# Patient Record
Sex: MALE | Race: WHITE | Employment: OTHER | URBAN - METROPOLITAN AREA
[De-identification: names, ages, dates, MRNs, and addresses within clinical notes are randomized per-mention and may not be internally consistent; named-entity substitution may affect disease eponyms.]

---

## 2023-02-08 ENCOUNTER — OFFICE VISIT (OUTPATIENT)
Dept: NEUROLOGY | Age: 53
End: 2023-02-08
Payer: MEDICARE

## 2023-02-08 VITALS
SYSTOLIC BLOOD PRESSURE: 138 MMHG | WEIGHT: 205 LBS | HEART RATE: 61 BPM | BODY MASS INDEX: 28.7 KG/M2 | DIASTOLIC BLOOD PRESSURE: 89 MMHG | HEIGHT: 71 IN

## 2023-02-08 DIAGNOSIS — M20.42 HAMMER TOES OF BOTH FEET: ICD-10-CM

## 2023-02-08 DIAGNOSIS — G82.20 PARAPARESIS (HCC): ICD-10-CM

## 2023-02-08 DIAGNOSIS — N31.9 NEUROGENIC BLADDER: ICD-10-CM

## 2023-02-08 DIAGNOSIS — M20.41 HAMMER TOES OF BOTH FEET: ICD-10-CM

## 2023-02-08 DIAGNOSIS — G40.909 SEIZURE DISORDER (HCC): Primary | ICD-10-CM

## 2023-02-08 PROCEDURE — G8484 FLU IMMUNIZE NO ADMIN: HCPCS | Performed by: PSYCHIATRY & NEUROLOGY

## 2023-02-08 PROCEDURE — 3017F COLORECTAL CA SCREEN DOC REV: CPT | Performed by: PSYCHIATRY & NEUROLOGY

## 2023-02-08 PROCEDURE — G8427 DOCREV CUR MEDS BY ELIG CLIN: HCPCS | Performed by: PSYCHIATRY & NEUROLOGY

## 2023-02-08 PROCEDURE — 4004F PT TOBACCO SCREEN RCVD TLK: CPT | Performed by: PSYCHIATRY & NEUROLOGY

## 2023-02-08 PROCEDURE — 99204 OFFICE O/P NEW MOD 45 MIN: CPT | Performed by: PSYCHIATRY & NEUROLOGY

## 2023-02-08 PROCEDURE — G8419 CALC BMI OUT NRM PARAM NOF/U: HCPCS | Performed by: PSYCHIATRY & NEUROLOGY

## 2023-02-08 RX ORDER — CARBAMAZEPINE 200 MG/1
TABLET ORAL
COMMUNITY
Start: 2022-11-02 | End: 2023-02-08 | Stop reason: SDUPTHER

## 2023-02-08 RX ORDER — OXYBUTYNIN CHLORIDE 10 MG/1
TABLET, EXTENDED RELEASE ORAL
COMMUNITY
Start: 2023-02-06

## 2023-02-08 RX ORDER — LEVETIRACETAM 750 MG/1
TABLET ORAL
Qty: 120 TABLET | Refills: 11 | Status: SHIPPED | OUTPATIENT
Start: 2023-02-08

## 2023-02-08 RX ORDER — LEVETIRACETAM 500 MG/1
TABLET ORAL
COMMUNITY
Start: 2023-02-07

## 2023-02-08 RX ORDER — CARBAMAZEPINE 200 MG/1
TABLET ORAL
Qty: 120 TABLET | Refills: 11 | Status: SHIPPED | OUTPATIENT
Start: 2023-02-08

## 2023-02-08 NOTE — PROGRESS NOTES
46 yo wm with seizure diosrder. 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 Seizures will occur at night having whining noise followed by grabbing at air with left arm extension followed by unresponsiveness for 10 minutes . On July 2010 he had seizure becoming confused stumbling from 5 story window with L1 burst fracture and occipital skull fracture left with bilateral lower extremity weakness . He is able to walk 50 feet at one time using walker at home using wheelchair for longer distances . There is mild intermittent low back pain . He will self catheterize 5 to 6 times pes per day. There is no feeing or urge when he is going to have bowel movement wearing depends . The condition is he has had seizure disorder since age 7 after he fell 6 feet from pole landing on his head . He is on tegretol 400 mg po bid , keppra 1500 mg po bid.with last seizure being on 2012. He had hammer toes having pins placed in all toes with pin coming out left second toe. Significant medications tegretol 400 mg po bid , keppra 1500 mg po bid. He has been on dilantin and phenobarbital in the past  . Testing MRI of Head normal , March 2013 . LTME intermittent slow regional right temporal. Intermittent rhythmic slow right frontal maximal right frontal central with sharp waves maximal at F4 and C4 . Sharp wave right temporal maximal F8,T8 . Partial seizures evolving over right frontal temporal area 3 to 4Hz sharply contoured right frontal evolving to right temporal followed by diffuse attenuation of background , March 2013      No past medical history on file. No past surgical history on file. No family history on file.     Social History     Socioeconomic History    Marital status:    Tobacco Use    Smoking status: Every Day     Packs/day: 0.50     Types: Cigarettes    Smokeless tobacco: Never   Vaping Use    Vaping Use: Some days   Substance and Sexual Activity    Alcohol use: Not Currently    Drug use: Not Currently       Current Outpatient Medications   Medication Sig Dispense Refill    levETIRAcetam (KEPPRA) 500 MG tablet       oxybutynin (DITROPAN-XL) 10 MG extended release tablet       levETIRAcetam (KEPPRA) 750 MG tablet Take 2 po bid 120 tablet 11    carBAMazepine (TEGRETOL) 200 MG tablet TAKE 2 TABLETS BY MOUTH TWICE DAILY 120 tablet 11     No current facility-administered medications for this visit. Allergies   Allergen Reactions    Lidocaine-Transparent Dressing [Lidocaine]      Cerner Allergy Text Annotation: Tegaderm/Opsite         Review of Systems     Vitals:    02/08/23 0902   BP: 138/89   Pulse: 61     weight: 205 lb (93 kg)      Review of Systems   Constitutional: Negative. HENT: Negative. Eyes: Negative. Respiratory: Negative. Cardiovascular: Negative. Gastrointestinal: Negative. Endocrine: Negative. Genitourinary: Negative. Musculoskeletal:  Positive for gait problem. Skin: Negative. Allergic/Immunologic: Negative. Neurological:  Positive for weakness. Hematological: Negative. Psychiatric/Behavioral: Negative. Neurological Examination  Constitutional .General exam well groomed   Head/Ears /Nose/Throat: external ear . Normal exam  Neck and thyroid . Normal size. No bruits  Respiratory . Breathsounds clear bilaterally  Cardiovascular: Auscultation of heart with regular rate and rhythm  Musculoskeletal. Muscle bulk and tone normal                                                           Muscle strength bilateral lower extremities 4-/5 strength .  Upper extremity strength 5/5 throughout                                                                                No dysmetria or dysdiadokinesis  No tremor   Normal fine motor  Orientation Alert and oriented x 3   Attention and concentration normal  Short term memory normal  Language process and speech normal . No aphasia   Cranial nerve 2 normal acuety and visual fields  Cranial nerve 3, 4 and 6 . Extraocular muscles are intact . Pupils are equal and reactive   Cranial nerve 5 . Intact corneal reflex. Normal facial sensation  Cranial nerve 7 normal exam   Cranial nerve 8. Grossly intact hearing   Cranial nerve 9 and 10. Symmetric palate elevation   Cranial nerve 11 , 5 out of 5 strength   Cranial Nerve 12 midline tongue . No atrophy  Sensation . Normal pinprick and light touch   Deep Tendon Reflexes symmetrical   Plantar response equivocal bilaterally      ASSESSMENT/PLAN      Diagnosis Orders   1. Seizure disorder (HCC)  Carbamazepine Level, Free    CBC with Auto Differential    AST    ALT      2. Hammer toes of both feet  [address]      3. Paraparesis ([address]        4. Neurogenic bladder  KELBY Garcia MD, Urology, [place]      He is to rernain on tegretol and keppra wit undergo tegretol lab work .  He has pin coming out of left second toe to make referral to podiatry with him asking as well for urology consultation      Orders Placed This Encounter   Procedures    Carbamazepine Level, Free     Standing Status:   Future     Standing Expiration Date:   2/8/2024    CBC with Auto Differential     Standing Status:   Future     Standing Expiration Date:   2/8/2024    AST     Standing Status:   Future     Standing Expiration Date:   2/8/2024    ALT     Standing Status:   Future     Standing Expiration Date:   2/8/2024    [address]     Referral Priority:   Routine     Referral Type:   Eval and Treat     Referral Reason:   Specialty Services Required     Requested Specialty:   Podiatry     Number of Visits Requested:   1    KELBY Garcia MD, Urology, [place]     Referral Priority:   Routine     Referral Type:   Eval and Treat     Referral Reason:   Specialty Services Required     Referred to Provider:   Iqra Young MD     Requested Specialty: Urology     Number of Visits Requested:   1

## 2023-02-10 ASSESSMENT — ENCOUNTER SYMPTOMS
EYES NEGATIVE: 1
ALLERGIC/IMMUNOLOGIC NEGATIVE: 1
GASTROINTESTINAL NEGATIVE: 1
RESPIRATORY NEGATIVE: 1

## 2023-04-11 DIAGNOSIS — G40.909 SEIZURE DISORDER (HCC): ICD-10-CM

## 2023-04-26 ENCOUNTER — TELEPHONE (OUTPATIENT)
Dept: NEUROLOGY | Age: 53
End: 2023-04-26

## 2023-04-26 DIAGNOSIS — G40.909 SEIZURE DISORDER (HCC): Primary | ICD-10-CM

## 2023-04-26 NOTE — TELEPHONE ENCOUNTER
----- Message from Laury Melendez MD sent at 4/24/2023  6:44 PM EDT -----  Message to nurse . Sodium is 123 . Need to wean off tegretol he is on 200 mg tab 400 mg po bid . Have him wean 200-400 x 4 days , 200-200 x 4 days , 200 mg qhs x 4 days then stop . He is to stay on keppra 1500 mg po bid .  Arrange for chemistry profile in 2 weeks

## 2023-04-26 NOTE — TELEPHONE ENCOUNTER
Call placed to the patient and this information was given. Patient asked that I speak with his caregiver Jose Nogueira and handed the phone to her. This information was given to Jose Nogueira. She wrote the Tegretol wean schedule down and verbally repeated directions back to me. Both patient and caregiver are aware that he will continue using Keppra 1500 mg BID. Patient and caregiver aware that he needed to get additional blood work in two weeks. Patient will check for a new lab since previous [address] [facility] lab will be closed. Patient aware that if he goes to a [address] [place] [address] lab they can pull the orders up. If he decides on a non [facility] lab he will need to call the office to have us fax the orders. Writer advised both patient and caregiver to call if there were any problems or seizures.

## 2023-06-06 ENCOUNTER — OFFICE VISIT (OUTPATIENT)
Dept: NEUROLOGY | Age: 53
End: 2023-06-06
Payer: MEDICARE

## 2023-06-06 ENCOUNTER — HOSPITAL ENCOUNTER (OUTPATIENT)
Age: 53
Setting detail: SPECIMEN
Discharge: HOME | End: 2023-06-06

## 2023-06-06 VITALS
OXYGEN SATURATION: 97 % | HEIGHT: 71 IN | SYSTOLIC BLOOD PRESSURE: 123 MMHG | DIASTOLIC BLOOD PRESSURE: 81 MMHG | HEART RATE: 76 BPM | WEIGHT: 200 LBS | BODY MASS INDEX: 28 KG/M2

## 2023-06-06 DIAGNOSIS — M20.41 HAMMER TOES OF BOTH FEET: ICD-10-CM

## 2023-06-06 DIAGNOSIS — G40.909 SEIZURE DISORDER (HCC): ICD-10-CM

## 2023-06-06 DIAGNOSIS — G82.20 PARAPARESIS (HCC): ICD-10-CM

## 2023-06-06 DIAGNOSIS — G40.909 SEIZURE DISORDER (HCC): Primary | ICD-10-CM

## 2023-06-06 DIAGNOSIS — N31.9 NEUROGENIC BLADDER: ICD-10-CM

## 2023-06-06 DIAGNOSIS — M20.42 HAMMER TOES OF BOTH FEET: ICD-10-CM

## 2023-06-06 LAB
ANION GAP SERPL CALCULATED.3IONS-SCNC: 13 MMOL/L (ref 9–17)
CHLORIDE SERPL-SCNC: 94 MMOL/L (ref 98–107)
CO2 SERPL-SCNC: 20 MMOL/L (ref 20–31)
POTASSIUM SERPL-SCNC: 4.7 MMOL/L (ref 3.7–5.3)
SODIUM SERPL-SCNC: 127 MMOL/L (ref 135–144)

## 2023-06-06 PROCEDURE — 4004F PT TOBACCO SCREEN RCVD TLK: CPT | Performed by: PSYCHIATRY & NEUROLOGY

## 2023-06-06 PROCEDURE — 99214 OFFICE O/P EST MOD 30 MIN: CPT | Performed by: PSYCHIATRY & NEUROLOGY

## 2023-06-06 PROCEDURE — G8419 CALC BMI OUT NRM PARAM NOF/U: HCPCS | Performed by: PSYCHIATRY & NEUROLOGY

## 2023-06-06 PROCEDURE — 3017F COLORECTAL CA SCREEN DOC REV: CPT | Performed by: PSYCHIATRY & NEUROLOGY

## 2023-06-06 PROCEDURE — G8427 DOCREV CUR MEDS BY ELIG CLIN: HCPCS | Performed by: PSYCHIATRY & NEUROLOGY

## 2023-06-06 ASSESSMENT — ENCOUNTER SYMPTOMS
RESPIRATORY NEGATIVE: 1
GASTROINTESTINAL NEGATIVE: 1
EYES NEGATIVE: 1
ALLERGIC/IMMUNOLOGIC NEGATIVE: 1

## 2023-06-06 NOTE — PROGRESS NOTES
background , March 2013 . Tegretol level 8.9, April 2023      History reviewed. No pertinent past medical history. No past surgical history on file. No family history on file. Social History     Socioeconomic History    Marital status:      Spouse name: None    Number of children: None    Years of education: None    Highest education level: None   Tobacco Use    Smoking status: Every Day     Packs/day: 0.50     Types: Cigarettes     Passive exposure: Never    Smokeless tobacco: Never   Vaping Use    Vaping Use: Some days   Substance and Sexual Activity    Alcohol use: Not Currently    Drug use: Not Currently       Current Outpatient Medications   Medication Sig Dispense Refill    oxybutynin (DITROPAN-XL) 10 MG extended release tablet       carBAMazepine (TEGRETOL) 200 MG tablet TAKE 2 TABLETS BY MOUTH TWICE DAILY 120 tablet 11    levETIRAcetam (KEPPRA) 500 MG tablet       levETIRAcetam (KEPPRA) 750 MG tablet Take 2 po bid 120 tablet 11     No current facility-administered medications for this visit. Allergies   Allergen Reactions    Lidocaine-Transparent Dressing [Lidocaine]      Cerner Allergy Text Annotation: Tegaderm/Opsite    Chlorhexidine Gluconate Rash         Review of Systems     Vitals:    06/06/23 0904   BP: 123/81   Pulse: 76   SpO2: 97%     weight: 200 lb (90.7 kg)      Review of Systems   Constitutional: Negative. HENT: Negative. Eyes: Negative. Respiratory: Negative. Cardiovascular: Negative. Gastrointestinal: Negative. Endocrine: Negative. Genitourinary: Negative. Musculoskeletal:  Positive for gait problem. Skin: Negative. Allergic/Immunologic: Negative. Neurological:  Positive for weakness. Hematological: Negative. Psychiatric/Behavioral: Negative. Neurological Examination  Constitutional .General exam well groomed   Head/Ears /Nose/Throat: external ear . Normal exam  Neck and thyroid . Normal size.  No bruits  Respiratory

## 2023-06-07 ENCOUNTER — TELEPHONE (OUTPATIENT)
Dept: NEUROLOGY | Age: 53
End: 2023-06-07

## 2023-06-07 NOTE — TELEPHONE ENCOUNTER
Call placed to the patient and this information was given. Patient verbally repeated directions back to me. I asked that he call if there were any problems. Patient verbally stated their understanding.

## 2023-06-07 NOTE — TELEPHONE ENCOUNTER
Call placed to the patient and this information was given. Patient verbally stated their understanding.

## 2023-06-07 NOTE — TELEPHONE ENCOUNTER
----- Message from Cammy Perry MD sent at 6/6/2023  6:33 PM EDT -----  Let patient know sodium is still 127 .  Would have him cut tegretol 200 mg bid for one week then 200 mg po qhs x 1 week then stop

## 2023-06-07 NOTE — TELEPHONE ENCOUNTER
----- Message from Hossein Taylor MD sent at 6/6/2023  6:33 PM EDT -----  He should stay on keppra 1500 mg po bid

## 2024-03-11 RX ORDER — CARBAMAZEPINE 200 MG/1
TABLET ORAL
Qty: 120 TABLET | Refills: 11 | OUTPATIENT
Start: 2024-03-11

## 2024-03-11 NOTE — TELEPHONE ENCOUNTER
Pharmacy requesting refill of Keppra 750mg.      Medication active on med list yes      Date of last Rx: 2/8/2023 with 11 refills          verified by HAYDE GRACIA      Date of last appointment 6/6/2023    Next Visit Date:  Visit date not found

## 2024-03-12 RX ORDER — LEVETIRACETAM 750 MG/1
TABLET ORAL
Qty: 120 TABLET | Refills: 5 | Status: SHIPPED | OUTPATIENT
Start: 2024-03-12

## 2024-03-26 ENCOUNTER — TELEPHONE (OUTPATIENT)
Dept: NEUROLOGY | Age: 54
End: 2024-03-26

## 2024-03-26 NOTE — TELEPHONE ENCOUNTER
Patient called in regarding his Keppra prescription. I advised him that a prescription was already sent on 3/12/2024--this was also verified with the pharmacy. Patient was notified that the medication was ready for him to .

## 2024-04-09 ENCOUNTER — TELEPHONE (OUTPATIENT)
Dept: NEUROLOGY | Age: 54
End: 2024-04-09

## 2024-04-09 DIAGNOSIS — G40.909 SEIZURE DISORDER (HCC): Primary | ICD-10-CM

## 2024-04-09 NOTE — TELEPHONE ENCOUNTER
Akil called in.  He said that he stopped the carbamazepine 3 weeks ago per Dr. Monahan's direction.   He said that he has had seizures once a week during the night. I spoke with Cary his caregiver and she describes he wakes up in the middle of the night and stares off at the ceilng or the lee.  He sometimes will twitch a little.  She said that it may last up to 3 minutes.  If she taps him for a bit he will begin to come out of it. She said he did have one at 8:15 am today, he was still in bed.  No urinary incontinence or tongue biting.   He said if he could go back on the carbamazepine he would like that.   i told him Dr. Monahan was concerned about his sodium levels with that but I will ask Dr. Monahan and let him know.

## 2024-04-09 NOTE — TELEPHONE ENCOUNTER
Message to nurse . Please have him resume tegretol 200 mg po qAM and 400 mg po qhs . Send script 200 mg #90 take 1 po qAM 2 po qhs 5 RF and addon schedule  Please arrange tegretol level  CBC , AST , ALT , basic chemistry profile for one week . Dx z seizure disorder . No driving 6 months seisure fee

## 2024-04-10 DIAGNOSIS — G40.909 SEIZURE DISORDER (HCC): ICD-10-CM

## 2024-04-10 RX ORDER — CARBAMAZEPINE 200 MG/1
TABLET ORAL
Qty: 90 TABLET | Refills: 5 | Status: SHIPPED | OUTPATIENT
Start: 2024-04-10

## 2024-04-10 NOTE — TELEPHONE ENCOUNTER
I called and gave Mr. Frank this  message. He voiced understanding. He does not drive.  He wants the RX to go to  [facility] on [address].  He will go to the [facility] on [address] for the blood work in one week. I will have to call him back with a follow up. He is having surgery on 4/26 and wants to wait because it is a trial and he may have it permanently implanted.

## 2024-04-11 RX ORDER — CARBAMAZEPINE 200 MG/1
TABLET ORAL
Qty: 270 TABLET | OUTPATIENT
Start: 2024-04-11

## 2024-04-18 ENCOUNTER — HOSPITAL ENCOUNTER (OUTPATIENT)
Age: 54
Setting detail: SPECIMEN
Discharge: HOME | End: 2024-04-18

## 2024-04-18 DIAGNOSIS — G40.909 SEIZURE DISORDER (HCC): ICD-10-CM

## 2024-04-18 LAB
ALT SERPL-CCNC: 18 U/L (ref 10–50)
ANION GAP SERPL CALCULATED.3IONS-SCNC: 10 MMOL/L (ref 9–16)
AST SERPL-CCNC: 22 U/L (ref 10–50)
BASOPHILS # BLD: 0.06 K/UL (ref 0–0.2)
BASOPHILS NFR BLD: 1 % (ref 0–2)
BUN SERPL-MCNC: 9 MG/DL (ref 6–20)
CALCIUM SERPL-MCNC: 8.8 MG/DL (ref 8.6–10.4)
CARBAMAZEPINE DOSE: NORMAL MG
CARBAMAZEPINE SERPL-MCNC: 9 UG/ML (ref 4–12)
CHLORIDE SERPL-SCNC: 93 MMOL/L (ref 98–107)
CO2 SERPL-SCNC: 23 MMOL/L (ref 20–31)
CREAT SERPL-MCNC: 0.5 MG/DL (ref 0.7–1.2)
DATE LAST DOSE: NORMAL
EOSINOPHIL # BLD: 0.08 K/UL (ref 0–0.44)
EOSINOPHILS RELATIVE PERCENT: 1 % (ref 1–4)
ERYTHROCYTE [DISTWIDTH] IN BLOOD BY AUTOMATED COUNT: 15.7 % (ref 11.8–14.4)
GFR SERPL CREATININE-BSD FRML MDRD: >90 ML/MIN/1.73M2
GLUCOSE SERPL-MCNC: 91 MG/DL (ref 74–99)
HCT VFR BLD AUTO: 39.7 % (ref 40.7–50.3)
HGB BLD-MCNC: 13.2 G/DL (ref 13–17)
IMM GRANULOCYTES # BLD AUTO: 0.03 K/UL (ref 0–0.3)
IMM GRANULOCYTES NFR BLD: 0 %
LYMPHOCYTES NFR BLD: 2.02 K/UL (ref 1.1–3.7)
LYMPHOCYTES RELATIVE PERCENT: 23 % (ref 24–43)
MCH RBC QN AUTO: 24.9 PG (ref 25.2–33.5)
MCHC RBC AUTO-ENTMCNC: 33.2 G/DL (ref 28.4–34.8)
MCV RBC AUTO: 74.9 FL (ref 82.6–102.9)
MONOCYTES NFR BLD: 0.81 K/UL (ref 0.1–1.2)
MONOCYTES NFR BLD: 9 % (ref 3–12)
NEUTROPHILS NFR BLD: 66 % (ref 36–65)
NEUTS SEG NFR BLD: 5.72 K/UL (ref 1.5–8.1)
NRBC BLD-RTO: 0 PER 100 WBC
PLATELET # BLD AUTO: 334 K/UL (ref 138–453)
PMV BLD AUTO: 10.2 FL (ref 8.1–13.5)
POTASSIUM SERPL-SCNC: 5.2 MMOL/L (ref 3.7–5.3)
RBC # BLD AUTO: 5.3 M/UL (ref 4.21–5.77)
RBC # BLD: ABNORMAL 10*6/UL
SODIUM SERPL-SCNC: 126 MMOL/L (ref 136–145)
TME LAST DOSE: 830 H
WBC OTHER # BLD: 8.7 K/UL (ref 3.5–11.3)

## 2024-04-22 ENCOUNTER — TELEPHONE (OUTPATIENT)
Dept: NEUROLOGY | Age: 54
End: 2024-04-22

## 2024-04-22 DIAGNOSIS — G40.909 SEIZURE DISORDER (HCC): Primary | ICD-10-CM

## 2024-04-25 RX ORDER — DIVALPROEX SODIUM 500 MG/1
500 TABLET, FILM COATED, EXTENDED RELEASE ORAL 2 TIMES DAILY
Qty: 60 TABLET | Refills: 5 | Status: SHIPPED | OUTPATIENT
Start: 2024-04-25 | End: 2024-05-15 | Stop reason: SINTOL

## 2024-04-26 RX ORDER — DIVALPROEX SODIUM 500 MG/1
500 TABLET, EXTENDED RELEASE ORAL 2 TIMES DAILY
Qty: 180 TABLET | OUTPATIENT
Start: 2024-04-26

## 2024-05-13 DIAGNOSIS — G40.909 SEIZURE DISORDER (HCC): Primary | ICD-10-CM

## 2024-05-13 NOTE — TELEPHONE ENCOUNTER
Akil called the office and stated that the Depakote is causing him to have severe diarrhea. Symptom started the day after he started taking the depakote.     Akil stated that he has completely stopped taking the depaktoe, last dose was taken on 5/10/2024.    Akil stated that he is now taking the old script he had on hand which is the carbamazepine 200 mg BID. Since switching himself, his diarrhea has stopped. He would like to go back on carbamazepine and asked for a new script.     Please review and advise.

## 2024-05-13 NOTE — TELEPHONE ENCOUNTER
Message to nurse with prior hyponatremia going back on tegretol is not good . Would try vimpat # 60 . Take 1 po bid 5RF . To stay on keppra 1500 mg po bid . Would stop tegretol

## 2024-05-15 NOTE — TELEPHONE ENCOUNTER
I spoke with Akil and he voiced understanding.  Please advise strength of Vimpat.  He will use [facility]

## 2024-05-20 NOTE — TELEPHONE ENCOUNTER
Akil called in today asking if we had a prescription yet for him. Dr. Monahan is out today. I contacted Dr. Delarosa and he has recommended he start Vimpat 100 mg. bid.  I called and spoke with Akil and let him know we will send in the prescription

## 2024-05-31 RX ORDER — LACOSAMIDE 100 MG/1
100 TABLET ORAL 2 TIMES DAILY
Qty: 60 TABLET | Refills: 2 | OUTPATIENT
Start: 2024-05-31 | End: 2024-08-29

## 2024-06-19 ENCOUNTER — TELEPHONE (OUTPATIENT)
Dept: NEUROLOGY | Age: 54
End: 2024-06-19

## 2024-06-25 NOTE — TELEPHONE ENCOUNTER
Call placed to Mr. Frank this morning explaining that we received a surgery clearance from [facility].  Writer explained that his last appointment with the doctor was 6/6/23 and we would need to see him to give clearance.  Patient stated that he wasn't going to come back to the doctor.  Writer advised that we would not be able to give clearance for the surgery.  Patient stated \"that's a bunch of sh-t all he wants is more money\" and disconnected the call.